# Patient Record
Sex: FEMALE | Race: WHITE
[De-identification: names, ages, dates, MRNs, and addresses within clinical notes are randomized per-mention and may not be internally consistent; named-entity substitution may affect disease eponyms.]

---

## 2020-11-25 ENCOUNTER — HOSPITAL ENCOUNTER (EMERGENCY)
Dept: HOSPITAL 62 - ER | Age: 55
LOS: 1 days | Discharge: HOME | End: 2020-11-26
Payer: MEDICARE

## 2020-11-25 VITALS — SYSTOLIC BLOOD PRESSURE: 149 MMHG | DIASTOLIC BLOOD PRESSURE: 74 MMHG

## 2020-11-25 DIAGNOSIS — M47.819: ICD-10-CM
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
## 2020-11-25 DIAGNOSIS — M54.5: ICD-10-CM

## 2020-11-25 DIAGNOSIS — S63.502A: Primary | ICD-10-CM

## 2020-11-25 PROCEDURE — 72110 X-RAY EXAM L-2 SPINE 4/>VWS: CPT

## 2020-11-25 PROCEDURE — 99283 EMERGENCY DEPT VISIT LOW MDM: CPT

## 2020-11-25 PROCEDURE — 73110 X-RAY EXAM OF WRIST: CPT

## 2020-11-25 NOTE — ER DOCUMENT REPORT
ED General





- General


Chief Complaint: Wrist Pain


Stated Complaint: POSSIBLE BACK AND LEFT WRIST INJURY


Time Seen by Provider: 11/25/20 23:20


Notes: 





Patient presents to the ER for evaluation of left wrist pain and low back pain 

following a slip and fall that occurred approximately 1 hour prior to arrival.  

The patient is ambulatory but states it causes increased pain.  She denies neck 

pain.  She denies headache or positive loss of consciousness.





Nursing notes reviewed and past medical, social, and family histories reviewed 

and validated.





- Related Data


Allergies/Adverse Reactions: 


                                        





No Known Allergies Allergy (Unverified 11/25/20 23:16)


   











Past Medical History





- General


Information source: Patient





- Social History


Smoking Status: Never Smoker


Frequency of alcohol use: None


Drug Abuse: None


Lives with: Alone


Family History: Reviewed & Not Pertinent


Patient has homicidal ideation: No





- Past Medical History


Cardiac Medical History: Reports: None


Pulmonary Medical History: Reports: None


EENT Medical History: Reports: None


Neurological Medical History: Reports: None


Endocrine Medical History: Reports: None


Renal/ Medical History: Reports: None


Malignancy Medical History: Reports: None


Musculoskeletal Medical History: Reports None


Other: 





Chronic pain


Skin Medical History: Reports None


Psychiatric Medical History: Reports: None


Traumatic Medical History: Reports: None


Infectious Medical History: Reports: None


Past Surgical History: Reports: None





- Immunizations


Immunizations up to date: Yes


Hx Diphtheria, Pertussis, Tetanus Vaccination: Yes





Review of Systems





- Review of Systems


Notes: 





Constitutional: Negative for fever.


HENT: Negative for sore throat.


Eyes: Negative for visual changes.


Cardiovascular: Negative for chest pain.


Respiratory: Negative for shortness of breath.


Gastrointestinal: Negative for abdominal pain, vomiting or diarrhea.


Genitourinary: Negative for dysuria.


Musculoskeletal: Positive low back pain.  Positive left wrist pain.


Skin: Negative for rash.


Neurological: Negative for headaches, weakness or numbness.





10 point ROS negative except as marked above and in HPI.





Physical Exam





- Vital signs


Vitals: 


                                        











Temp Pulse Resp BP Pulse Ox


 


 98.8 F   96   20   149/74 H  100 


 


 11/25/20 22:22  11/25/20 22:22  11/25/20 22:22  11/25/20 22:22  11/25/20 22:22














- Notes


Notes: 








CONSTITUTIONAL: Well appearing in no acute distress


SKIN: Warm, dry, and intact without rash


EYES: Extraocular movements are grossly intact, clear conjunctiva


HENT: Normocephalic, atraumatic, moist mucus membranes


NECK: No obvious swelling, normal range of motion


PULMONARY: Normal chest rise and fall, no respiratory distress or stridor


CARDIOVASCULAR: Regular rate, distal extremities are warm and well perfused


NEUROLOGIC: Normal speech, moves all extremities


MUSCULOSKELETAL: Mild tenderness palpation over the radial aspect of the left 

wrist.  There is also mild tenderness palpation over the left paravertebral 

lumbar muscle.  No spinal tenderness noted.


PSYCHIATRIC: Normal mood and affect





Course





- Re-evaluation


Re-evalutation: 





11/26/20 01:42





Rechecked patient who has responded well to treatment in the ER.  Discussed with

patient:  results, diagnosis, treatment plan, and need for follow-up.  Return to

the emergency department warnings were given.  All questions and concerns were 

addressed.  The plan is agreed with and understood.  Patient is stable and ready

for discharge.





- Vital Signs


Vital signs: 


                                        











Temp Pulse Resp BP Pulse Ox


 


 98.8 F   96   20   149/74 H  100 


 


 11/25/20 22:22  11/25/20 22:22  11/25/20 22:22  11/25/20 22:22  11/25/20 22:22














- Diagnostic Test


Radiology reviewed: Reports reviewed





Procedures





- Immobilization


  ** Left Wrist


Time completed: 01:43


Pre-Proc Neuro Vasc Exam: Normal


Immobilizer type: Other - Velcro wrist splint


Performed by: RN


Post-Proc Neuro Vasc Exam: Normal


Alignment checked and good: Yes





Discharge





- Discharge


Clinical Impression: 


Left wrist sprain


Qualifiers:


 Encounter type: initial encounter Qualified Code(s): S63.502A - Unspecified 

sprain of left wrist, initial encounter





Low back pain


Qualifiers:


 Chronicity: acute Back pain laterality: unspecified Sciatica presence: without 

sciatica Qualified Code(s): M54.5 - Low back pain





Condition: Stable


Disposition: HOME, SELF-CARE


Instructions:  Wrist Sprain ([facility]), Low Back Pain ([facility])


Prescriptions: 


Ibuprofen [Motrin 800 mg Tablet] 800 mg PO Q8HP PRN #20 tab


 PRN Reason: For Pain

## 2020-11-26 NOTE — RADIOLOGY REPORT (SQ)
Left wrist radiographs: 11/25/2020 11:16 PM CST



TECHNIQUE: AP, lateral, internal and external oblique images of

the left wrist were obtained.



COMPARISON: None available



HISTORY: 55-year-old patient with left wrist pain, fall.



FINDINGS: The joint spaces are congruent. There are no findings

to suggest an acute fracture or subluxation. The soft tissues are

unremarkable. The scapholunate interval is within normal limits.

The carpal arcs appear to be intact.



IMPRESSION: There are no findings to suggest an acute fracture or

subluxation of the left wrist.

## 2020-11-26 NOTE — RADIOLOGY REPORT (SQ)
EXAM DESCRIPTION: 



XR LUMBAR SPINE ANTEROPOSTERIOR, LATERAL, AND OBLIQUES



COMPLETED DATE/TME:  11/25/2020 23:54



CLINICAL HISTORY: 



55 years, Female, fall



COMPARISON:

None.



NUMBER OF VIEWS:

Five



TECHNIQUE:

AP, lateral, and spot view of the lumbosacral Junction, and

bilateral oblique views of the lumbar spine were obtained.



LIMITATIONS:

None.



FINDINGS:



There is no fracture or subluxation. There is no definite pars

defect. There is mild multilevel marginal osteophyte formation.

There is moderate disc space narrowing at L3-4 and there is mild

disc space narrowing at L4-5. There is mild multilevel facet

arthropathy. Incidental arterial calcifications are noted.

Partial visualization of bilateral hip hardware.



IMPRESSION:



Multilevel degenerative changes as above.

 



copyright 2011 [employer]- All Rights Reserved